# Patient Record
Sex: MALE | Race: BLACK OR AFRICAN AMERICAN
[De-identification: names, ages, dates, MRNs, and addresses within clinical notes are randomized per-mention and may not be internally consistent; named-entity substitution may affect disease eponyms.]

---

## 2019-06-16 ENCOUNTER — HOSPITAL ENCOUNTER (EMERGENCY)
Dept: HOSPITAL 65 - ER | Age: 24
Discharge: HOME | End: 2019-06-16
Payer: COMMERCIAL

## 2019-06-16 VITALS — WEIGHT: 194 LBS | BODY MASS INDEX: 25.71 KG/M2 | HEIGHT: 73 IN

## 2019-06-16 VITALS — DIASTOLIC BLOOD PRESSURE: 100 MMHG | SYSTOLIC BLOOD PRESSURE: 151 MMHG

## 2019-06-16 VITALS — SYSTOLIC BLOOD PRESSURE: 151 MMHG | DIASTOLIC BLOOD PRESSURE: 100 MMHG

## 2019-06-16 DIAGNOSIS — J45.901: Primary | ICD-10-CM

## 2019-06-16 DIAGNOSIS — F17.210: ICD-10-CM

## 2019-06-16 PROCEDURE — 99283 EMERGENCY DEPT VISIT LOW MDM: CPT

## 2019-06-16 NOTE — NUR
ARRIVAL

PATIENT ARRIVED TO [facility] AMBULATORY, C/O OF ASTHMA SYMPTOMS SINCE LAST NIGHT, 
PATIENT STATES HE RAN OUT OF HIS INHALER AND CAME TO THE ED FOR EVAL.

## 2019-06-16 NOTE — ER.PDOC
General


Chief Complaint:  Dyspnea/Respdistress


Stated Complaint:  SOB


Time seen by MD:  13:00


Source:  patient


Exam Limitations:  no limitations





History of Present Illness


Timing/Duration:  24 hours


Severity:  mild


Initiating Event:  out of meds


Associated Symptoms:  trouble breathing, shortness of breath, cough


Medication Course:  PRN


Prior symptoms/Treatment:  Similar symptoms previous





Past Medical History


Medical History:  asthma


Surgical History:  no surgical history





Social History


Smoking:  cigarettes


Alcohol Use:  none


Drug Use:  none





Reviewed


Nursing Reviewed:  Vital Signs, Abn. Noted





All Other Systems:  Reviewed and Negative





Physical Exam


General Appearance:  alert, no distress


EENT:  eyes nml, no nystagmus, ENT nml inspection, pharynx nml


Neck:  nml inspection, non-tender


Respiratory:  rhonchi


Cardiovascular:  Normal Peripheral Pulses, Regular Rate, Rhythm, No Edema, No 

Gallop, No JVD, No Murmur


Abdomen:  non-tender, no organomegaly


Skin:  Normal Color, Warm/Dry


Extremities:  non-tender, nml ROM, no pedal edema





Results/Orders


Results/Orders





Vital Signs








  Date Time  Temp Pulse Resp B/P (MAP) Pulse Ox O2 Delivery O2 Flow Rate FiO2


 


6/16/19 13:04 98.3 94 18  97 Room Air  





 98.3       


 


6/16/19 13:03 98.3 94 18     





 98.3       











Departure


Time of Disposition:  13:33


Disposition:  01 HOME, SELF-CARE


Impression:  


   Primary Impression:  


   Asthma exacerbation


Condition:  Stable


Duration or Time Spent with Pa:  AMY Crane MD              Jun 16, 2019 13:09